# Patient Record
Sex: FEMALE
[De-identification: names, ages, dates, MRNs, and addresses within clinical notes are randomized per-mention and may not be internally consistent; named-entity substitution may affect disease eponyms.]

---

## 2018-12-20 ENCOUNTER — HOSPITAL ENCOUNTER (EMERGENCY)
Dept: HOSPITAL 14 - H.ER | Age: 23
Discharge: HOME | End: 2018-12-20
Payer: COMMERCIAL

## 2018-12-20 VITALS
DIASTOLIC BLOOD PRESSURE: 72 MMHG | TEMPERATURE: 97.9 F | SYSTOLIC BLOOD PRESSURE: 110 MMHG | RESPIRATION RATE: 16 BRPM | HEART RATE: 78 BPM

## 2018-12-20 VITALS — OXYGEN SATURATION: 100 %

## 2018-12-20 DIAGNOSIS — N83.202: Primary | ICD-10-CM

## 2018-12-20 LAB
ALBUMIN SERPL-MCNC: 4.3 G/DL (ref 3.5–5)
ALBUMIN/GLOB SERPL: 1.5 {RATIO} (ref 1–2.1)
ALT SERPL-CCNC: 32 U/L (ref 9–52)
AST SERPL-CCNC: 35 U/L (ref 14–36)
BASOPHILS # BLD AUTO: 0.1 K/UL (ref 0–0.2)
BASOPHILS NFR BLD: 0.9 % (ref 0–2)
BILIRUB UR-MCNC: NEGATIVE MG/DL
BUN SERPL-MCNC: 19 MG/DL (ref 7–17)
CALCIUM SERPL-MCNC: 9.1 MG/DL (ref 8.4–10.2)
COLOR UR: YELLOW
EOSINOPHIL # BLD AUTO: 0 K/UL (ref 0–0.7)
EOSINOPHIL NFR BLD: 0.7 % (ref 0–4)
ERYTHROCYTE [DISTWIDTH] IN BLOOD BY AUTOMATED COUNT: 13.1 % (ref 11.5–14.5)
GFR NON-AFRICAN AMERICAN: > 60
GLUCOSE UR STRIP-MCNC: (no result) MG/DL
HGB BLD-MCNC: 13 G/DL (ref 12–16)
LEUKOCYTE ESTERASE UR-ACNC: (no result) LEU/UL
LIPASE SERPL-CCNC: 91 U/L (ref 23–300)
LYMPHOCYTES # BLD AUTO: 1.7 K/UL (ref 1–4.3)
LYMPHOCYTES NFR BLD AUTO: 28.4 % (ref 20–40)
MCH RBC QN AUTO: 31.3 PG (ref 27–31)
MCHC RBC AUTO-ENTMCNC: 33.5 G/DL (ref 33–37)
MCV RBC AUTO: 93.6 FL (ref 81–99)
MONOCYTES # BLD: 0.5 K/UL (ref 0–0.8)
MONOCYTES NFR BLD: 8.3 % (ref 0–10)
NEUTROPHILS # BLD: 3.7 K/UL (ref 1.8–7)
NEUTROPHILS NFR BLD AUTO: 61.7 % (ref 50–75)
NRBC BLD AUTO-RTO: 0.2 % (ref 0–0)
PH UR STRIP: 6 [PH] (ref 5–8)
PLATELET # BLD: 314 K/UL (ref 130–400)
PMV BLD AUTO: 7.4 FL (ref 7.2–11.7)
PROT UR STRIP-MCNC: NEGATIVE MG/DL
RBC # BLD AUTO: 4.15 MIL/UL (ref 3.8–5.2)
RBC # UR STRIP: NEGATIVE /UL
SP GR UR STRIP: 1.02 (ref 1–1.03)
SQUAMOUS EPITHIAL: 8 /HPF (ref 0–5)
URINE CLARITY: (no result)
UROBILINOGEN UR-MCNC: (no result) MG/DL (ref 0.2–1)
WBC # BLD AUTO: 6 K/UL (ref 4.8–10.8)

## 2018-12-20 PROCEDURE — 81003 URINALYSIS AUTO W/O SCOPE: CPT

## 2018-12-20 PROCEDURE — 99284 EMERGENCY DEPT VISIT MOD MDM: CPT

## 2018-12-20 PROCEDURE — 74177 CT ABD & PELVIS W/CONTRAST: CPT

## 2018-12-20 PROCEDURE — 81025 URINE PREGNANCY TEST: CPT

## 2018-12-20 PROCEDURE — 76830 TRANSVAGINAL US NON-OB: CPT

## 2018-12-20 PROCEDURE — 85025 COMPLETE CBC W/AUTO DIFF WBC: CPT

## 2018-12-20 PROCEDURE — 83690 ASSAY OF LIPASE: CPT

## 2018-12-20 PROCEDURE — 96374 THER/PROPH/DIAG INJ IV PUSH: CPT

## 2018-12-20 PROCEDURE — 80053 COMPREHEN METABOLIC PANEL: CPT

## 2018-12-20 NOTE — ED PDOC
- Laboratory Results


Result Diagrams: 


                                 18 15:18





                                 18 15:18





- ECG


O2 Sat by Pulse Oximetry: 100 (RA)


Pulse Ox Interpretation: Normal





Medical Decision Making


Medical Decision Makin


--Patient signed out to this provider pending imaging results, reevaluation and 

disposition. 





20:46 


US 


Findings 


Uterus 


Measures 6.6 x 3.7 x 5 cm. Normal in size and retroverted. No fibroid or other 

mass lesion seen. 


Endometrium 


Measures 12 mm in diameter. Unremarkable. 


Right ovary 


Measures 2.5 x 1.4 x 3.1 cm. No solid mass. Normal flow. 


Left ovary 


Measures 4.2 x 2.8 x 4.5 cm. No solid mass. Normal flow. Cyst measures 3.3 x 2.1

x 3.7 cm. 


Free fluid 


Fluid is seen in the cul-de-sac. 


Other Findings 


None. 


Impression 


1.  Fluid is seen in the cul-de-sac. 


2.  Left ovarian cyst.











--Patient requires no further treatment in the ED. She is stable for discharge. 

Diagnosis is ovarian cyst. 





--------

--------------------------------------------------------------------------------


---------


Scribe Attestation:


Documented by Paulette Urbina acting as a scribe for Cm Randhawa MD





Provider Scribe Attestation:


All medical record entries made by the Scribe were at my direction and 

personally dictated by me. I have reviewed the chart and agree that the record 

accurately reflects my personal performance of the history, physical exam, 

medical decision making, and the department course for this patient. I have also

personally directed, reviewed, and agree with the discharge instructions and 

disposition.





Disposition





- Clinical Impression


Clinical Impression: 


 Abdominal pain, Haemorrhagic cyst








- POA


Present On Arrival: None





- Disposition


Disposition: Routine/Home


Disposition Time: 21:01


Condition: STABLE


Prescriptions: 


Naproxen [Naprosyn] 500 mg PO Q12 #14 tab


Instructions:  Ovarian Cysts


Forms:  [employer] (English)

## 2018-12-20 NOTE — ED PDOC
HPI: Abdomen


Time Seen by Provider: 12/20/18 14:48


Chief Complaint (Nursing): Abdominal Pain


Chief Complaint (Provider): Abdominal Pain


History Per: Patient


History/Exam Limitations: no limitations


Onset/Duration Of Symptoms: Days (x 1)


Current Symptoms Are (Timing): Still Present


Location Of Pain/Discomfort: Diffuse, RLQ


Quality Of Discomfort: "Pain"


Associated Symptoms: Nausea, Vomiting


Additional Complaint(s): 


23 year old female presents to the ED with right sided abdominal pain associated

with nausea and vomiting beginning today. Patient reports that she visited [facility] 

Urgent care and was sent to the ED citing the possibility of appendicitis. She 

vomited twice prior to arrival. Patient takes no medications other than adderall

and birth control. Denies urinary symptoms, diarrhea, vaginal bleeding and 

pelvic pain. 








PMD: Dr. Haas





Abnormal Vaginal Bleeding: No





Past Medical History


Reviewed: Historical Data, Nursing Documentation, Vital Signs


Vital Signs: 





                                Last Vital Signs











Temp  97.0 F L  12/20/18 14:30


 


Pulse  76   12/20/18 14:30


 


Resp  18   12/20/18 14:30


 


BP  108/72   12/20/18 14:30


 


Pulse Ox  100   12/20/18 14:30














- Medical History


PMH: No Chronic Diseases





- Surgical History


Other surgeries: AVM removal in 2014





- Family History


Family History: States: Unknown Family Hx





- Allergies


Allergies/Adverse Reactions: 


                                    Allergies











Allergy/AdvReac Type Severity Reaction Status Date / Time


 


neomycin Allergy  URTICARIA Verified 12/20/18 14:30














Review of Systems


ROS Statement: Except As Marked, All Systems Reviewed And Found Negative


ENT: Positive for: Nose Congestion


Respiratory: Positive for: Sputum


Gastrointestinal: Positive for: Nausea, Vomiting, Abdominal Pain.  Negative for:

Diarrhea


Genitourinary Female: Negative for: Dysuria, Frequency, Incontinence, Vaginal 

Bleeding, Pelvic Pain





Physical Exam





- Reviewed


Nursing Documentation Reviewed: Yes


Vital Signs Reviewed: Yes





- Physical Exam


Appears: Positive for: No Acute Distress


Head Exam: Positive for: ATRAUMATIC, NORMAL INSPECTION, NORMOCEPHALIC


Skin: Positive for: Normal Color, Warm, Dry


Eye Exam: Positive for: EOMI, Normal appearance, PERRL


Neck: Positive for: Normal, Painless ROM, Supple


Cardiovascular/Chest: Positive for: Regular Rate, Rhythm.  Negative for: Murmur


Respiratory: Positive for: Normal Breath Sounds.  Negative for: Respiratory 

Distress


Gastrointestinal/Abdominal: Positive for: Tenderness (diffuse).  Negative for: 

Guarding, Rebound


Extremity: Positive for: Normal ROM (upper and lower extremities).  Negative fo

r: Deformity


Neurologic/Psych: Positive for: Alert, Oriented (x 3).  Negative for: 

Motor/Sensory Deficits





- Laboratory Results


Result Diagrams: 


                                 12/20/18 15:18





                                 12/20/18 15:18





- ECG


O2 Sat by Pulse Oximetry: 100 (RA)


Pulse Ox Interpretation: Normal





- Progress


ED Course And Treament: 





1900:  Stable.  Dr. Randhawa to fu on US.  Appendix ok on Ct.  





Medical Decision Making


Medical Decision Making: 


15:03 


Impression: abdominal pain, nausea and vomiting


Initial Plan: 


--Abd Pelvis CT 


--CMP 


--urine dip 


--Lipase


--CBC 


--NS IV 


--Omnipaque 50 ml PO 


--Zofran Inj 4 mg IV


--UA











------------------------------------------------------------

-------------------------------------


Scribe Attestation:


Documented by Paulette Urbina, acting as a scribe for Trevor Ramirez MD 


   


Provider Scribe Attestation:


All medical record entries made by the Scribe were at my direction and 

personally dictated by me. I have reviewed the chart and agree that the record 

accurately reflects my personal performance of the history, physical exam, 

medical decision making, and the department course for this patient. I have also

personally directed, reviewed, and agree with the discharge instructions and 

disposition.





Disposition





- Clinical Impression


Clinical Impression: 


 Abdominal pain, Haemorrhagic cyst








- Patient ED Disposition


Is Patient to be Admitted: No





- Disposition


Disposition: Transfer of Care


Disposition Time: 19:05


Condition: STABLE


Forms:  [employer] (English)


Patient Signed Over To: Cm Randhawa

## 2018-12-20 NOTE — CT
Date of service: 



12/20/2018



PROCEDURE:  CT Abdomen and Pelvis with contrast



HISTORY:

abd pain



COMPARISON:

None available.



TECHNIQUE:

CT scan of the abdomen and pelvis was performed after administration 

of intravenous contrast. Oral contrast was administered.  Coronal and 

sagittal reformatted images were obtained.



Contrast dose: 90 mL Omnipaque 300 



Radiation dose:



Total exam DLP = 473.23 mGy-cm.



This CT exam was performed using one or more of the following dose 

reduction techniques: Automated exposure control, adjustment of the 

mA and/or kV according to patient size, and/or use of iterative 

reconstruction technique.



FINDINGS:



LOWER THORAX:

The visualized lungs are clear. 



LIVER:

Fatty liver with homogeneous enhancement.  No gross lesion or ductal 

dilatation. 



GALLBLADDER AND BILE DUCTS:

The gallbladder is contracted. 



PANCREAS:

Normal in size with homogeneous enhancement.  No gross lesion or 

ductal dilatation.



SPLEEN:

Normal in size and appearance. 



ADRENALS:

No discrete nodule. 



KIDNEYS AND URETERS:

Normal in size with homogeneous enhancement.  No hydronephrosis. No 

solid mass. 



VASCULATURE:

No aortic aneurysm. 



BOWEL:

The small bowel loops are normal in caliber.  There is moderate 

amount of stool in the ascending and transverse colon.  No bowel wall 

thickening or obstruction. 



APPENDIX:

Normal appendix. 



PERITONEUM:

No free fluid. No free air. 



LYMPH NODES:

No enlarged lymph nodes. 



BLADDER:

Well distended and normal in appearance. 



REPRODUCTIVE:

The uterus is normal in size.  There fluid in the endometrial canal.  

There is a 3.8 x 2.8 cm high attenuation cyst in the left ovary 



BONES:

No acute fracture.  Within normal limits for the patient's age. 



OTHER FINDINGS:

None.



IMPRESSION:

No CT evidence for acute appendicitis. 



3.8 x 2.8 cm complicated/hemorrhagic cyst in the left ovary.  If 

clinically indicated, correlation with pelvic ultrasound may be 

performed to exclude torsion.

## 2018-12-21 NOTE — US
Date of service: 



12/20/2018



HISTORY:

vaginal bleeding



COMPARISON:

None available.



TECHNIQUE:

Transvaginal only



FINDINGS:



UTERUS:

Measures 6.6 x 3.7 x 5.0 cm. Retroverted.  No fibroid or other mass 

lesion seen.



ENDOMETRIUM:

Measures 12 mm in diameter. Unremarkable. 



CERVIX:

No cervical abnormality identified.



RIGHT OVARY:

Measures 2.5 x 1.4 x 3.1 cm. No solid mass. Normal flow. 



LEFT OVARY:

Measures 4.2 x 2.8 x 4.5 cm. No solid mass. Normal flow. Minimally 

complicated cyst, 2.1 x 3.3 x 3.7 cm with low-level internal echoes, 

likely post hemorrhagic debris.  Recommend follow-up transvaginal 

pelvic ultrasound examination in 6-12 weeks.



FREE FLUID:

Minimal fluid in cul-de-sac



OTHER FINDINGS:

None. 



IMPRESSION:

3.7 cm minimally complicated left ovarian cyst with low-level 

internal echoes.  Likely post hemorrhagic debris.  Recommend 

follow-up transvaginal ultrasound examination in 6-12 weeks.  

Nonspecific minimal fluid in cul-de-sac.  Otherwise unremarkable 

examination.



The preliminary findings for this examination were reported by [facility] at 8:53 p.m. on 12/20/2018.  There is discordance of this 

report with the preliminary findings.  The complicated nature of the 

left ovarian cyst was not described in the preliminary report of this 

examination please note follow-up is recommended with transvaginal 

ultrasound examination as above.